# Patient Record
Sex: FEMALE | Race: WHITE | NOT HISPANIC OR LATINO | Employment: STUDENT | URBAN - METROPOLITAN AREA
[De-identification: names, ages, dates, MRNs, and addresses within clinical notes are randomized per-mention and may not be internally consistent; named-entity substitution may affect disease eponyms.]

---

## 2024-02-08 ENCOUNTER — HOSPITAL ENCOUNTER (EMERGENCY)
Facility: OTHER | Age: 24
Discharge: HOME | End: 2024-02-08
Attending: EMERGENCY MEDICINE
Payer: COMMERCIAL

## 2024-02-08 VITALS
BODY MASS INDEX: 20.38 KG/M2 | OXYGEN SATURATION: 100 % | HEIGHT: 63 IN | WEIGHT: 115 LBS | TEMPERATURE: 98 F | HEART RATE: 97 BPM | SYSTOLIC BLOOD PRESSURE: 118 MMHG | RESPIRATION RATE: 20 BRPM | DIASTOLIC BLOOD PRESSURE: 76 MMHG

## 2024-02-08 DIAGNOSIS — M54.10 RADICULOPATHY AFFECTING UPPER EXTREMITY: Primary | ICD-10-CM

## 2024-02-08 DIAGNOSIS — M54.9 UPPER BACK PAIN: ICD-10-CM

## 2024-02-08 LAB
B-HCG UR QL: NEGATIVE
CTP QC/QA: YES

## 2024-02-08 PROCEDURE — 99284 EMERGENCY DEPT VISIT MOD MDM: CPT | Mod: 25

## 2024-02-08 PROCEDURE — 96372 THER/PROPH/DIAG INJ SC/IM: CPT | Performed by: EMERGENCY MEDICINE

## 2024-02-08 PROCEDURE — 81025 URINE PREGNANCY TEST: CPT | Performed by: PHYSICIAN ASSISTANT

## 2024-02-08 PROCEDURE — 63600175 PHARM REV CODE 636 W HCPCS: Performed by: EMERGENCY MEDICINE

## 2024-02-08 PROCEDURE — 25000003 PHARM REV CODE 250: Performed by: EMERGENCY MEDICINE

## 2024-02-08 RX ORDER — METHOCARBAMOL 500 MG/1
1000 TABLET, FILM COATED ORAL
Status: COMPLETED | OUTPATIENT
Start: 2024-02-08 | End: 2024-02-08

## 2024-02-08 RX ORDER — IBUPROFEN 800 MG/1
800 TABLET ORAL EVERY 6 HOURS PRN
Qty: 20 TABLET | Refills: 0 | Status: SHIPPED | OUTPATIENT
Start: 2024-02-08 | End: 2024-02-20

## 2024-02-08 RX ORDER — METHOCARBAMOL 500 MG/1
500 TABLET, FILM COATED ORAL 3 TIMES DAILY PRN
Qty: 30 TABLET | Refills: 0 | Status: SHIPPED | OUTPATIENT
Start: 2024-02-08 | End: 2024-02-20

## 2024-02-08 RX ORDER — KETOROLAC TROMETHAMINE 30 MG/ML
30 INJECTION, SOLUTION INTRAMUSCULAR; INTRAVENOUS
Status: COMPLETED | OUTPATIENT
Start: 2024-02-08 | End: 2024-02-08

## 2024-02-08 RX ADMIN — KETOROLAC TROMETHAMINE 30 MG: 30 INJECTION, SOLUTION INTRAMUSCULAR; INTRAVENOUS at 03:02

## 2024-02-08 RX ADMIN — METHOCARBAMOL 1000 MG: 500 TABLET ORAL at 03:02

## 2024-02-08 NOTE — FIRST PROVIDER EVALUATION
Emergency Department TeleTriage Encounter Note      CHIEF COMPLAINT    Chief Complaint   Patient presents with    Back Pain     L side mid-back pain onset last pm. Reports pain worsening today and causing tingling to L arm. Denies falls/injuries. No facial droop, limb drift noted. Took OTC meds and using ice/heat without improvement.       VITAL SIGNS   Initial Vitals [02/08/24 1324]   BP Pulse Resp Temp SpO2   (!) 138/93 (!) 115 18 97.9 °F (36.6 °C) --      MAP       --            ALLERGIES    Review of patient's allergies indicates:  No Known Allergies    PROVIDER TRIAGE NOTE  Patient presents with left upper back pain and now decreased sensation to the right forearm and into the 4th and 5th finger. . Pain worse with movement and inspiration. No shortness of breath.       ORDERS  Labs Reviewed - No data to display    ED Orders (720h ago, onward)      None              Virtual Visit Note: The provider triage portion of this emergency department evaluation and documentation was performed via "[employer] a HIPAA-compliant telemedicine application, in concert with a tele-presenter in the room. A face to face patient evaluation with one of my colleagues will occur once the patient is placed in an emergency department room.      DISCLAIMER: This note was prepared with [employer] voice recognition transcription software. Garbled syntax, mangled pronouns, and other bizarre constructions may be attributed to that software system.

## 2024-02-08 NOTE — DISCHARGE INSTRUCTIONS
We have provided you with medication for pain.  Please fill and take as directed.    Please return to the ER if you have chest pain, difficulty breathing, fevers, altered mental status, dizziness, weakness, or any other concerns.      Follow up with your primary care physician.

## 2024-02-08 NOTE — ED PROVIDER NOTES
Encounter Date: 2/8/2024    SCRIBE #1 NOTE: I, Lavelleguillermo Ny, am scribing for, and in the presence of,  Jo Burnett MD.       History     Chief Complaint   Patient presents with    Back Pain     L side mid-back pain onset last pm. Reports pain worsening today and causing tingling to R arm. Denies falls/injuries. No facial droop, limb drift noted. Took OTC meds and using ice/heat without improvement.     Time seen by provider: 2:26 PM    Grace Figueroa is a 24 y.o. right-hand dominant female who presents to the ED with R sided upper back pain for the past four days. The patient notes worsening of her pain upon waking up and reports associated tingling sensations in her right 5th digit. This morning she began having tingling sensations radiating throughout her right arm and into her right 3rd, 4th, and 5th digits. Patient notes analgesic use of two ibuprofen this AM without relief along with stretching, heat, and ice with minimal relief. Patient denies any strenuous activity or carrying large bags.  She has some mild neck pain.  Denies any weakness of her arm.  Denies any fevers/chills.  Denies any injury.  She denies any PMHx or allergies. This is the extent of the patient's complaints today in the Emergency Department.      The history is provided by the patient and a significant other.     Review of patient's allergies indicates:  No Known Allergies  No past medical history on file.  No past surgical history on file.  No family history on file.     Review of Systems   Constitutional:  Negative for chills and fever.   Musculoskeletal:  Positive for back pain and neck pain.   Skin:  Negative for color change and rash.   Neurological:  Positive for numbness. Negative for dizziness, weakness and headaches.        Paresthesia.       Physical Exam     Initial Vitals   BP Pulse Resp Temp SpO2   02/08/24 1324 02/08/24 1324 02/08/24 1324 02/08/24 1324 02/08/24 1625   (!) 138/93 (!) 115 18 97.9 °F (36.6 °C) 100 %      MAP        --                Physical Exam    Nursing note and vitals reviewed.  Constitutional: She appears well-developed and well-nourished.   HENT:   Head: Normocephalic and atraumatic.   Eyes: Conjunctivae are normal.   Cardiovascular:             2+ radial pulses.    Pulmonary/Chest: No respiratory distress.   Musculoskeletal:         General: Tenderness present. Normal range of motion.      Comments: Tenderness to the right paraspinal thoracic region just medial to the scapula. No cervical or thoracic midline tenderness.  Full range of motion intact with all extremities.     Neurological: She is alert and oriented to person, place, and time. She has normal strength.   5/5 strength and sensation in upper and lower extremities.  Ambulatory with steady gait.   Skin: Skin is warm and dry. Capillary refill takes less than 2 seconds.         ED Course   Procedures  Labs Reviewed   POCT URINE PREGNANCY          Imaging Results              X-Ray Thoracic Spine AP And Lateral (Final result)  Result time 02/08/24 14:51:18      Final result by Chalo Estrada III, MD (02/08/24 14:51:18)                   Impression:      Normal thoracic spine.      Electronically signed by: Chalo Estrada MD  Date:    02/08/2024  Time:    14:51               Narrative:    EXAMINATION:  XR THORACIC SPINE AP LATERAL    CLINICAL HISTORY:  Dorsalgia, unspecified    FINDINGS:  Thoracic spine AP lateral two views.    Alignment is normal.  No fracture dislocation bone destruction seen.  There is no significant DJD.                                    X-Rays:   Independently Interpreted Readings:   Other Readings:  Thoracic XR:  No acute fracture or dislocation    Medications   ketorolac injection 30 mg (30 mg Intramuscular Given 2/8/24 1504)   methocarbamoL tablet 1,000 mg (1,000 mg Oral Given 2/8/24 1504)     Medical Decision Making  2:26PM:  Patient is a 24-year-old female who presents to the emergency department with R sided back pain with  associated RUE numbness and paresthesia.  Patient does have a focal point of tenderness just medial to the right scapula.  I suspect the patient likely has a pinched nerve affecting C8 or T1 given the distribution of the paresthesias.  Patient received an x-ray from triage.  I do not feel that further imaging is indicated at this time.  Will plan for analgesia, will continue to follow and reassess.    Amount and/or Complexity of Data Reviewed  External Data Reviewed: notes.  Labs: ordered. Decision-making details documented in ED Course.  Radiology: ordered and independent interpretation performed. Decision-making details documented in ED Course.    Risk  Prescription drug management.    4:20 PM:  Patient doing well, she is feeling much better.  Her pain has improved.  Her x-ray is negative for any acute findings.  Will plan to treat conservatively with a course of NSAIDs and muscle relaxants at home.  I do not feel that further work up in the ED is indicated at this time.  I updated pt regarding results and I counseled pt regarding supportive care measures.  I have discussed with the pt ED return warnings and need for close PCP f/u.  Pt agreeable to plan and all questions answered.  I feel that pt is stable for discharge and management as an outpatient and no further intervention is needed at this time.  Pt is comfortable returning to the ED if needed.  Will DC home in stable condition.          Scribe Attestation:   Scribe #1: I performed the above scribed service and the documentation accurately describes the services I performed. I attest to the accuracy of the note.    Physician Attestation for Scribe: I, Jo Burnett, reviewed documentation as scribed in my presence, which is both accurate and complete.                              Clinical Impression:  Final diagnoses:  [M54.9] Upper back pain  [M54.10] Radiculopathy affecting upper extremity (Primary)          ED Disposition Condition    Discharge Stable           ED Prescriptions       Medication Sig Dispense Start Date End Date Auth. Provider    ibuprofen (ADVIL,MOTRIN) 800 MG tablet Take 1 tablet (800 mg total) by mouth every 6 (six) hours as needed for Pain. 20 tablet 2/8/2024 -- Jo Burnett MD    methocarbamoL (ROBAXIN) 500 MG Tab Take 1 tablet (500 mg total) by mouth 3 (three) times daily as needed (back pain). 30 tablet 2/8/2024 2/18/2024 Jo Burnett MD          Follow-up Information       Follow up With Specialties Details Why Contact Info    Primary care Physician                 Jo Burnett MD  02/08/24 6644

## 2024-02-13 ENCOUNTER — HOSPITAL ENCOUNTER (EMERGENCY)
Facility: OTHER | Age: 24
Discharge: HOME | End: 2024-02-13
Attending: EMERGENCY MEDICINE
Payer: COMMERCIAL

## 2024-02-13 VITALS
HEIGHT: 63 IN | TEMPERATURE: 99 F | RESPIRATION RATE: 20 BRPM | HEART RATE: 79 BPM | WEIGHT: 115 LBS | OXYGEN SATURATION: 99 % | BODY MASS INDEX: 20.38 KG/M2 | DIASTOLIC BLOOD PRESSURE: 57 MMHG | SYSTOLIC BLOOD PRESSURE: 107 MMHG

## 2024-02-13 DIAGNOSIS — R20.0 NUMBNESS: ICD-10-CM

## 2024-02-13 DIAGNOSIS — R20.2 PARESTHESIAS: Primary | ICD-10-CM

## 2024-02-13 LAB
ANION GAP SERPL CALC-SCNC: 9 MMOL/L (ref 8–16)
B-HCG UR QL: NEGATIVE
BASOPHILS # BLD AUTO: 0.04 K/UL (ref 0–0.2)
BASOPHILS NFR BLD: 0.7 % (ref 0–1.9)
BUN SERPL-MCNC: 7 MG/DL (ref 6–20)
CALCIUM SERPL-MCNC: 9.9 MG/DL (ref 8.7–10.5)
CHLORIDE SERPL-SCNC: 107 MMOL/L (ref 95–110)
CO2 SERPL-SCNC: 23 MMOL/L (ref 23–29)
CREAT SERPL-MCNC: 0.8 MG/DL (ref 0.5–1.4)
CTP QC/QA: YES
DIFFERENTIAL METHOD BLD: ABNORMAL
EOSINOPHIL # BLD AUTO: 0 K/UL (ref 0–0.5)
EOSINOPHIL NFR BLD: 0.7 % (ref 0–8)
ERYTHROCYTE [DISTWIDTH] IN BLOOD BY AUTOMATED COUNT: 11.4 % (ref 11.5–14.5)
EST. GFR  (NO RACE VARIABLE): >60 ML/MIN/1.73 M^2
GLUCOSE SERPL-MCNC: 90 MG/DL (ref 70–110)
HCT VFR BLD AUTO: 39.4 % (ref 37–48.5)
HCV AB SERPL QL IA: NEGATIVE
HGB BLD-MCNC: 13.7 G/DL (ref 12–16)
HIV 1+2 AB+HIV1 P24 AG SERPL QL IA: NEGATIVE
IMM GRANULOCYTES # BLD AUTO: 0.01 K/UL (ref 0–0.04)
IMM GRANULOCYTES NFR BLD AUTO: 0.2 % (ref 0–0.5)
LYMPHOCYTES # BLD AUTO: 1.6 K/UL (ref 1–4.8)
LYMPHOCYTES NFR BLD: 27.8 % (ref 18–48)
MAGNESIUM SERPL-MCNC: 2 MG/DL (ref 1.6–2.6)
MCH RBC QN AUTO: 30.3 PG (ref 27–31)
MCHC RBC AUTO-ENTMCNC: 34.8 G/DL (ref 32–36)
MCV RBC AUTO: 87 FL (ref 82–98)
MONOCYTES # BLD AUTO: 0.4 K/UL (ref 0.3–1)
MONOCYTES NFR BLD: 7.5 % (ref 4–15)
NEUTROPHILS # BLD AUTO: 3.5 K/UL (ref 1.8–7.7)
NEUTROPHILS NFR BLD: 63.1 % (ref 38–73)
NRBC BLD-RTO: 0 /100 WBC
PHOSPHATE SERPL-MCNC: 3 MG/DL (ref 2.7–4.5)
PLATELET # BLD AUTO: 271 K/UL (ref 150–450)
PMV BLD AUTO: 8.6 FL (ref 9.2–12.9)
POTASSIUM SERPL-SCNC: 4.7 MMOL/L (ref 3.5–5.1)
RBC # BLD AUTO: 4.52 M/UL (ref 4–5.4)
SODIUM SERPL-SCNC: 139 MMOL/L (ref 136–145)
TSH SERPL DL<=0.005 MIU/L-ACNC: 0.9 UIU/ML (ref 0.4–4)
WBC # BLD AUTO: 5.58 K/UL (ref 3.9–12.7)

## 2024-02-13 PROCEDURE — 93010 ELECTROCARDIOGRAM REPORT: CPT | Mod: ,,, | Performed by: INTERNAL MEDICINE

## 2024-02-13 PROCEDURE — 93005 ELECTROCARDIOGRAM TRACING: CPT

## 2024-02-13 PROCEDURE — 84443 ASSAY THYROID STIM HORMONE: CPT | Performed by: EMERGENCY MEDICINE

## 2024-02-13 PROCEDURE — 81025 URINE PREGNANCY TEST: CPT | Performed by: EMERGENCY MEDICINE

## 2024-02-13 PROCEDURE — 87389 HIV-1 AG W/HIV-1&-2 AB AG IA: CPT | Performed by: EMERGENCY MEDICINE

## 2024-02-13 PROCEDURE — 83735 ASSAY OF MAGNESIUM: CPT | Performed by: EMERGENCY MEDICINE

## 2024-02-13 PROCEDURE — 96360 HYDRATION IV INFUSION INIT: CPT

## 2024-02-13 PROCEDURE — 84100 ASSAY OF PHOSPHORUS: CPT | Performed by: EMERGENCY MEDICINE

## 2024-02-13 PROCEDURE — 99284 EMERGENCY DEPT VISIT MOD MDM: CPT | Mod: 25

## 2024-02-13 PROCEDURE — 25000003 PHARM REV CODE 250: Performed by: EMERGENCY MEDICINE

## 2024-02-13 PROCEDURE — 86803 HEPATITIS C AB TEST: CPT | Performed by: EMERGENCY MEDICINE

## 2024-02-13 PROCEDURE — 36415 COLL VENOUS BLD VENIPUNCTURE: CPT | Performed by: EMERGENCY MEDICINE

## 2024-02-13 PROCEDURE — 85025 COMPLETE CBC W/AUTO DIFF WBC: CPT | Performed by: EMERGENCY MEDICINE

## 2024-02-13 PROCEDURE — 80048 BASIC METABOLIC PNL TOTAL CA: CPT | Performed by: EMERGENCY MEDICINE

## 2024-02-13 RX ORDER — LORAZEPAM 1 MG/1
0.5 TABLET ORAL EVERY 6 HOURS PRN
Qty: 10 TABLET | Refills: 0 | Status: SHIPPED | OUTPATIENT
Start: 2024-02-13 | End: 2024-02-20

## 2024-02-13 RX ORDER — LORAZEPAM 1 MG/1
1 TABLET ORAL
Status: COMPLETED | OUTPATIENT
Start: 2024-02-13 | End: 2024-02-13

## 2024-02-13 RX ORDER — SODIUM CHLORIDE 9 MG/ML
1000 INJECTION, SOLUTION INTRAVENOUS
Status: COMPLETED | OUTPATIENT
Start: 2024-02-13 | End: 2024-02-13

## 2024-02-13 RX ADMIN — SODIUM CHLORIDE 1000 ML: 9 INJECTION, SOLUTION INTRAVENOUS at 01:02

## 2024-02-13 RX ADMIN — LORAZEPAM 1 MG: 1 TABLET ORAL at 01:02

## 2024-02-13 NOTE — ED TRIAGE NOTES
Pt reports right hand numbness on Wednesday night and then on Friday she had numbness to the left and and BLE. She was seen several days ago for the same s/s. But states that it is worsening. She was prescribed muscle relaxors but has not been taking them because of how they make her feel. Denies trauma.

## 2024-02-13 NOTE — ED PROVIDER NOTES
"     Source of History:  The patient    Chief complaint:  Numbness (Reports R hand numbness starting Thursday now progressing to bilateral hand, feet and leg numbness with nausea and fatigue. )      HPI:  Grace Figueroa is a 24 y.o. female  who presents with intermittent paresthesias that have been going on for the last 4-5 days.  Noted she had right hand numbness starting 5 days ago in which she came in the emergency department.  Diagnosed with radiculopathy was discharged.  States symptoms initially improved but then noticed numbness in the right side as well as the left hand and also in the feet.  Denies any weakness.  Denies any chest pain or shortness of breath.  No fevers or chills.  No heavy lifting.  No loss of bowel or bladder.  Denies any visual changes or headache.    This is the extent to the patients complaints today here in the emergency department.    ROS:   See HPI.    Review of patient's allergies indicates:  No Known Allergies    PMH:  As per HPI and below:  No past medical history on file.  No past surgical history on file.         Physical Exam:    /76 (BP Location: Left arm, Patient Position: Sitting)   Pulse 90   Temp 98.5 °F (36.9 °C) (Oral)   Resp 16   Ht 5' 3" (1.6 m)   Wt 52.2 kg (115 lb)   SpO2 99%   BMI 20.37 kg/m²   Nursing note and vital signs reviewed.  Constitutional: No acute distress.  Nontoxic  Cardiovascular: Regular rate and rhythm.  No murmurs. No gallops. No rubs  Respiratory: Clear to auscultation bilaterally.  Good air movement.  No wheezes.  No rhonchi. No rales. No accessory muscle use..  Abdomen: Soft.  Not distended.  Nontender.  No guarding.  No rebound. Non-peritoneal.  Musculoskeletal: Good range of motion all joints.  No deformities.  Neck supple.  No meningismus.  No midline tenderness step-offs or deformities of the cervical, thoracic or lumbar spine.  Extremities: No pitting edema  Neuro: alert. At baseline.  Cranial nerves 2-12 are intact.  All " extremities are 5/5 strength.  Motor and sensory intact.  No ataxia.  Negative Romberg test.  No cerebellar findings.  Good finger-to-nose.  No focal neurological deficits.    Summary of Previous Medical Records:  Patient is seen 5 days ago on 02/08/2024 in the emergency department.  Had x-ray of the thoracic spine showing no acute abnormality.  Given Toradol as well as methocarbamol.  Examination at that time had tenderness palpation the right paraspinal thoracic region and suspected radiculopathy.    Differential Dx considered but not limited to:    Paresthesias of unknown etiology, acute stress reaction, metabolic derangement.  Less likely be acute kidney injury.  She has no focal neurological deficits to suggest intracranial etiology.  I have considered but have a low suspicion for MS or spinal cord mass.    MDM/ Workup:  24-year-old female with nonspecific paresthesias.  They are bilateral making stroke unlikely.  Will get basic labs as she did not have these last time as well as thyroid.  I feel that there is a component of anxiety with this.  Do not feel any imaging of the brain is indicated at this time.      ED Course as of 02/13/24 1425   Tue Feb 13, 2024   1303 WBC: 5.58 [SM]   1303 Hemoglobin: 13.7 [SM]   1303 Platelet Count: 271 [SM]   1315 hCG Qualitative, Urine: Negative [SM]   1315 EKG 12-lead  EKG independently interpreted by myself shows normal sinus rhythm at a rate of 94, normal intervals, narrow QRS, no acute ST T wave abnormalities.  No previous EKG to compare to.  No acute disease.. [SM]   1332 Basic Metabolic Panel  normal [SM]   1354 Magnesium : 2.0 [SM]   1354 Phosphorus Level: 3.0 [SM]   1403 On re-evaluation patient is resting comfortably and symptoms have resolved.  Updated her on the results thus far.  Pending TSH and plan for discharge. [SM]   1422 TSH: 0.895 [SM]   1422 No further workup is indicated in the emergency department today.  I updated pt regarding results and I counseled pt  regarding supportive care measures.  Diagnosis and treatment plan explained to patient. I have answered all questions and the patient is satisfied with the plan of care. Patient discharged home in stable condition.  [SM]      ED Course User Index  [SM] Toni Escobar DO                 Diagnostic Impression:    1. Paresthesias    2. Numbness         ED Disposition Condition    Discharge Stable            ED Prescriptions       Medication Sig Dispense Start Date End Date Auth. Provider    LORazepam (ATIVAN) 1 MG tablet Take 0.5 tablets (0.5 mg total) by mouth every 6 (six) hours as needed for Anxiety. 10 tablet 2/13/2024 3/14/2024 Toni Escobar DO          Follow-up Information       Follow up With Specialties Details Why Contact Info Additional Information    [facility] Schedule an appointment as soon as possible for a visit in 1 week  [id number]  [facility] [id number] [phone] [facility] [place] - Main Building, 7th Floor Please park in [facility] and take Clinic elevator             Toni Escobar DO  02/13/24 [id number]

## 2024-02-14 LAB
OHS QRS DURATION: 84 MS
OHS QTC CALCULATION: 452 MS

## 2024-02-20 ENCOUNTER — LAB VISIT (OUTPATIENT)
Dept: LAB | Facility: HOSPITAL | Age: 24
End: 2024-02-20
Attending: PSYCHIATRY & NEUROLOGY
Payer: COMMERCIAL

## 2024-02-20 ENCOUNTER — OFFICE VISIT (OUTPATIENT)
Dept: NEUROLOGY | Facility: CLINIC | Age: 24
End: 2024-02-20
Payer: COMMERCIAL

## 2024-02-20 VITALS
HEART RATE: 94 BPM | HEIGHT: 63 IN | DIASTOLIC BLOOD PRESSURE: 80 MMHG | SYSTOLIC BLOOD PRESSURE: 113 MMHG | WEIGHT: 116.88 LBS | BODY MASS INDEX: 20.71 KG/M2

## 2024-02-20 DIAGNOSIS — R20.0 NUMBNESS: Primary | ICD-10-CM

## 2024-02-20 DIAGNOSIS — R53.1 WEAKNESS: ICD-10-CM

## 2024-02-20 DIAGNOSIS — R20.2 PARESTHESIAS: ICD-10-CM

## 2024-02-20 LAB
ERYTHROCYTE [SEDIMENTATION RATE] IN BLOOD BY PHOTOMETRIC METHOD: <2 MM/HR (ref 0–36)
ESTIMATED AVG GLUCOSE: 85 MG/DL (ref 68–131)
HBA1C MFR BLD: 4.6 % (ref 4–5.6)
TSH SERPL DL<=0.005 MIU/L-ACNC: 2.07 UIU/ML (ref 0.4–4)

## 2024-02-20 PROCEDURE — 36415 COLL VENOUS BLD VENIPUNCTURE: CPT | Performed by: PSYCHIATRY & NEUROLOGY

## 2024-02-20 PROCEDURE — 3044F HG A1C LEVEL LT 7.0%: CPT | Mod: CPTII,S$GLB,, | Performed by: PSYCHIATRY & NEUROLOGY

## 2024-02-20 PROCEDURE — 99204 OFFICE O/P NEW MOD 45 MIN: CPT | Mod: S$GLB,,, | Performed by: PSYCHIATRY & NEUROLOGY

## 2024-02-20 PROCEDURE — 82607 VITAMIN B-12: CPT | Performed by: PSYCHIATRY & NEUROLOGY

## 2024-02-20 PROCEDURE — 84443 ASSAY THYROID STIM HORMONE: CPT | Performed by: PSYCHIATRY & NEUROLOGY

## 2024-02-20 PROCEDURE — 99999 PR PBB SHADOW E&M-EST. PATIENT-LVL III: CPT | Mod: PBBFAC,,, | Performed by: PSYCHIATRY & NEUROLOGY

## 2024-02-20 PROCEDURE — 1160F RVW MEDS BY RX/DR IN RCRD: CPT | Mod: CPTII,S$GLB,, | Performed by: PSYCHIATRY & NEUROLOGY

## 2024-02-20 PROCEDURE — 83036 HEMOGLOBIN GLYCOSYLATED A1C: CPT | Performed by: PSYCHIATRY & NEUROLOGY

## 2024-02-20 PROCEDURE — 3074F SYST BP LT 130 MM HG: CPT | Mod: CPTII,S$GLB,, | Performed by: PSYCHIATRY & NEUROLOGY

## 2024-02-20 PROCEDURE — 82746 ASSAY OF FOLIC ACID SERUM: CPT | Performed by: PSYCHIATRY & NEUROLOGY

## 2024-02-20 PROCEDURE — 3079F DIAST BP 80-89 MM HG: CPT | Mod: CPTII,S$GLB,, | Performed by: PSYCHIATRY & NEUROLOGY

## 2024-02-20 PROCEDURE — 3008F BODY MASS INDEX DOCD: CPT | Mod: CPTII,S$GLB,, | Performed by: PSYCHIATRY & NEUROLOGY

## 2024-02-20 PROCEDURE — 1159F MED LIST DOCD IN RCRD: CPT | Mod: CPTII,S$GLB,, | Performed by: PSYCHIATRY & NEUROLOGY

## 2024-02-20 PROCEDURE — 85652 RBC SED RATE AUTOMATED: CPT | Performed by: PSYCHIATRY & NEUROLOGY

## 2024-02-20 RX ORDER — KETOROLAC TROMETHAMINE 10 MG/1
TABLET, FILM COATED ORAL
COMMUNITY
Start: 2024-02-14 | End: 2024-02-20

## 2024-02-20 RX ORDER — PREDNISONE 10 MG/1
10 TABLET ORAL 3 TIMES DAILY
COMMUNITY
Start: 2024-02-14 | End: 2024-02-20

## 2024-02-20 RX ORDER — BACLOFEN 10 MG/1
10 TABLET ORAL
COMMUNITY
Start: 2024-02-14 | End: 2024-02-20

## 2024-02-20 NOTE — PROGRESS NOTES
"Subjective:       Patient ID: Grace Figueroa is a 24 y.o. female.    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  She is here with her boyfriend.      The symptoms began 2 and half weeks ago; she has been to the emergency room twice because of the numbness. There was no accident or injury.  She recalls she awoke with numbness in her right hand and an aching in her mid scapula....The numbness then spread to the left hand and to both feet over the course of the next 2-3 days.  On Brandon Gras day she noticed that her left hand and arm was weak as well and on that day she had a telehealth visit and the physician recommended that she go to the ER.  She has had some persistence of pain in her mid back which is variable in location.      In the ER she was treated with a steroid dose pack, Toradol, baclofen.  She states the symptoms vary in intensity but are constant.      This is the first time that she has had these symptoms.    She has no bulbar symptoms and no bowel or bladder troubles.    No hx of ON      She has not been sickly lately.  I.e. no weight loss, fever etc..  She consumes a normal diet.    Does not take a supplements   Does not smoke cigarettes drinks alcohol on rare occasions.    Had COVID 4 yrs ago with no residua.     She has no family history of neuropathy.  Her mother had "painless migraines"          No past medical history on file.   No past surgical history on file.   No current outpatient medications on file.   Review of patient's allergies indicates:  No Known Allergies     Review of Systems   Constitutional:  Negative for fever.   Respiratory:  Positive for chest tightness (intermittent). Negative for cough and shortness of breath.    Genitourinary:  Positive for frequency. Negative for bladder incontinence and difficulty urinating.   Neurological:  Positive for memory loss ('brain fog'). Negative for headaches. "   Psychiatric/Behavioral:  Positive for sleep disturbance. The patient is not nervous/anxious (only anxious about the new medical issues).            Objective:      Physical Exam  Constitutional:       Appearance: She is not ill-appearing.   Neurological:      Mental Status: She is alert.      Cranial Nerves: Cranial nerves 2-12 are intact. No cranial nerve deficit or dysarthria.      Sensory: No sensory deficit.      Motor: No weakness, tremor, atrophy, abnormal muscle tone or pronator drift.      Coordination: Coordination normal.      Gait: Gait and tandem walk normal.      Deep Tendon Reflexes: Reflexes normal. Babinski sign absent on the right side. Babinski sign absent on the left side.      Comments: EOMI  Neg MGP   Psychiatric:         Thought Content: Thought content normal.           Assessment:       1. Numbness    2. Paresthesias    3. Weakness        Plan:          2 1/2 weeks s/p acute onset of numbness of BUE and both feet, which began in a stuttering fashion, and w/ mild LT hand weakness and scapular pain.  Neuro exam benign.  Ddx includes cervical myelopathy and sensory predominant acute PN. FND also in ddx.     Plan   MRI C spine  NCS 3 limbs  Lab                     Nazia Morales MD   03/03/2024   3:22 PM

## 2024-02-21 LAB
FOLATE SERPL-MCNC: 7.4 NG/ML (ref 4–24)
VIT B12 SERPL-MCNC: 235 PG/ML (ref 210–950)

## 2024-02-22 ENCOUNTER — PATIENT MESSAGE (OUTPATIENT)
Dept: NEUROLOGY | Facility: CLINIC | Age: 24
End: 2024-02-22
Payer: COMMERCIAL

## 2024-02-24 ENCOUNTER — PATIENT MESSAGE (OUTPATIENT)
Dept: NEUROLOGY | Facility: CLINIC | Age: 24
End: 2024-02-24
Payer: COMMERCIAL